# Patient Record
Sex: FEMALE | Race: WHITE | NOT HISPANIC OR LATINO | URBAN - METROPOLITAN AREA
[De-identification: names, ages, dates, MRNs, and addresses within clinical notes are randomized per-mention and may not be internally consistent; named-entity substitution may affect disease eponyms.]

---

## 2021-02-11 DIAGNOSIS — Z23 ENCOUNTER FOR IMMUNIZATION: ICD-10-CM

## 2021-02-15 ENCOUNTER — IMMUNIZATIONS (OUTPATIENT)
Dept: FAMILY MEDICINE CLINIC | Facility: HOSPITAL | Age: 81
End: 2021-02-15

## 2021-02-15 DIAGNOSIS — Z23 ENCOUNTER FOR IMMUNIZATION: Primary | ICD-10-CM

## 2021-02-15 PROCEDURE — 91300 SARS-COV-2 / COVID-19 MRNA VACCINE (PFIZER-BIONTECH) 30 MCG: CPT

## 2021-02-15 PROCEDURE — 0001A SARS-COV-2 / COVID-19 MRNA VACCINE (PFIZER-BIONTECH) 30 MCG: CPT

## 2021-03-09 ENCOUNTER — IMMUNIZATIONS (OUTPATIENT)
Dept: FAMILY MEDICINE CLINIC | Facility: HOSPITAL | Age: 81
End: 2021-03-09

## 2021-03-09 DIAGNOSIS — Z23 ENCOUNTER FOR IMMUNIZATION: Primary | ICD-10-CM

## 2021-03-09 PROCEDURE — 0002A SARS-COV-2 / COVID-19 MRNA VACCINE (PFIZER-BIONTECH) 30 MCG: CPT

## 2021-03-09 PROCEDURE — 91300 SARS-COV-2 / COVID-19 MRNA VACCINE (PFIZER-BIONTECH) 30 MCG: CPT

## 2023-12-15 ENCOUNTER — APPOINTMENT (EMERGENCY)
Dept: RADIOLOGY | Facility: HOSPITAL | Age: 83
End: 2023-12-15
Payer: MEDICARE

## 2023-12-15 ENCOUNTER — HOSPITAL ENCOUNTER (EMERGENCY)
Facility: HOSPITAL | Age: 83
Discharge: HOME | End: 2023-12-15
Attending: EMERGENCY MEDICINE
Payer: MEDICARE

## 2023-12-15 VITALS
OXYGEN SATURATION: 94 % | TEMPERATURE: 97.6 F | HEART RATE: 86 BPM | RESPIRATION RATE: 20 BRPM | SYSTOLIC BLOOD PRESSURE: 171 MMHG | DIASTOLIC BLOOD PRESSURE: 71 MMHG

## 2023-12-15 DIAGNOSIS — S43.006A SHOULDER DISLOCATION: ICD-10-CM
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
## 2023-12-15 DIAGNOSIS — T14.8XXA NEURAPRAXIA: ICD-10-CM

## 2023-12-15 PROCEDURE — G1004 CDSM NDSC: HCPCS

## 2023-12-15 PROCEDURE — 73030 X-RAY EXAM OF SHOULDER: CPT

## 2023-12-15 PROCEDURE — 70450 CT HEAD/BRAIN W/O DYE: CPT

## 2023-12-15 PROCEDURE — 99284 EMERGENCY DEPT VISIT MOD MDM: CPT

## 2023-12-15 PROCEDURE — 96374 THER/PROPH/DIAG INJ IV PUSH: CPT

## 2023-12-15 RX ORDER — ONDANSETRON 2 MG/ML
1 INJECTION INTRAMUSCULAR; INTRAVENOUS ONCE
Status: COMPLETED | OUTPATIENT
Start: 2023-12-15 | End: 2023-12-15

## 2023-12-15 RX ORDER — ACETAMINOPHEN 325 MG/1
975 TABLET ORAL ONCE
Status: COMPLETED | OUTPATIENT
Start: 2023-12-15 | End: 2023-12-15

## 2023-12-15 RX ORDER — KETOROLAC TROMETHAMINE 30 MG/ML
15 INJECTION, SOLUTION INTRAMUSCULAR; INTRAVENOUS ONCE
Status: DISCONTINUED | OUTPATIENT
Start: 2023-12-15 | End: 2023-12-15

## 2023-12-15 RX ORDER — FENTANYL CITRATE 50 UG/ML
1 INJECTION, SOLUTION INTRAMUSCULAR; INTRAVENOUS ONCE
Status: COMPLETED | OUTPATIENT
Start: 2023-12-15 | End: 2023-12-15

## 2023-12-15 RX ORDER — HYDROMORPHONE HCL IN WATER/PF 6 MG/30 ML
0.2 PATIENT CONTROLLED ANALGESIA SYRINGE INTRAVENOUS ONCE
Status: DISCONTINUED | OUTPATIENT
Start: 2023-12-15 | End: 2023-12-16 | Stop reason: HOSPADM

## 2023-12-15 RX ORDER — CHLOROPROCAINE HYDROCHLORIDE 30 MG/ML
20 INJECTION, SOLUTION EPIDURAL; INFILTRATION; INTRACAUDAL; PERINEURAL ONCE
Status: COMPLETED | OUTPATIENT
Start: 2023-12-15 | End: 2023-12-15

## 2023-12-15 RX ORDER — KETOROLAC TROMETHAMINE 30 MG/ML
15 INJECTION, SOLUTION INTRAMUSCULAR; INTRAVENOUS ONCE
Status: COMPLETED | OUTPATIENT
Start: 2023-12-15 | End: 2023-12-15

## 2023-12-15 RX ORDER — NAPROXEN 500 MG/1
500 TABLET ORAL 2 TIMES DAILY WITH MEALS
Qty: 30 TABLET | Refills: 0 | Status: SHIPPED | OUTPATIENT
Start: 2023-12-15

## 2023-12-15 RX ADMIN — KETOROLAC TROMETHAMINE 15 MG: 30 INJECTION, SOLUTION INTRAMUSCULAR; INTRAVENOUS at 18:03

## 2023-12-15 RX ADMIN — ACETAMINOPHEN 975 MG: 325 TABLET, FILM COATED ORAL at 18:03

## 2023-12-15 RX ADMIN — CHLOROPROCAINE HYDROCHLORIDE 20 ML: 30 INJECTION, SOLUTION EPIDURAL; INFILTRATION; INTRACAUDAL; PERINEURAL at 19:40

## 2023-12-15 NOTE — ED ATTENDING ATTESTATION
Final Diagnoses:     1. Fall, initial encounter    2. Shoulder dislocation    3. Martha Redman MD, saw and evaluated the patient. All available labs and X-rays were ordered by me or the resident / non-physician and have been reviewed by myself. I discussed the patient with the resident / non-physician and agree with the resident's / non-physician practitioner's findings and plan as documented in the resident's / non-physician practicitioner's note, except where noted. At this point, I agree with the current assessment done in the ED. I was present during key portions of all procedures performed unless otherwise stated. HPI:  NURSING TRIAGE:    This is a 81 y.o. female presenting for evaluation of likely right humeral fracture. The patient states that she was walking, tripped onto the patio, hit her right arm. No head trauma. She is on aspirin. No loss conscious. Remembers everything that happened. No numbness going down the arms or legs. Chief Complaint   Patient presents with    Fall     Trip onto patio fell onto R arm. Pain in R shoulder and arm +asa -thinners -LOC       PHYSICAL: ASSESSMENT + PLAN:   Pertinent: proximal RIGHT humeral tenderness, clavicular tenderness  M/U/R nerve sensation intact. Reduced axillary nerve    strength 5/5. Good capillary refill. 2+ radial pulses, bilaterally equal.   Finger abduction/adduction/opposition intact. Suppination/Pronation intact without reproduction of pain. Able to 1+2 and 1+5 finger appose. Able to 2+3 finger cross. No scaphoid tenderness  No snuff box tenderness. WE/WF/WRD/WUD 5/5, intact, without pain. BICEPS/TRICEPS intact but internal/external rotation elicit severe hsoulder pain. Refused Shoulder abduction/adduction    General: VS reviewed  Appears in NAD  awake, alert. Well-nourished, well-developed. Appears stated age. Speaking normally in full sentences.    Head: Normocephalic, atraumatic  Eyes: EOM-I. No diplopia. No hyphema. No subconjunctival hemorrhages. Symmetrical lids. ENT: Atraumatic external nose and ears. MMM  No malocclusion. No stridor. Normal phonation. No drooling. Normal swallowing. Neck: No JVD. CV: No pallor noted  Lungs:   No tachypnea  No respiratory distress  Abd: soft nt nd no rebound/guarding  MSK:   FROM spontaneously except as asbov.e   Skin: Dry, intact. Neuro: Awake, alert, GCS15, CN II-XII grossly intact. Motor grossly intact. Psychiatric/Behavioral: interacting normally; appropriate mood/affect.  Exam: deferred    Vitals:    12/15/23 1701 12/15/23 1748   BP: (!) 171/71    BP Location: Right arm    Pulse: 86    Resp: 20    Temp:  97.6 °F (36.4 °C)   TempSrc:  Tympanic   SpO2: 94%     X-ray for humeral fracture. No signs of head trauma. However, [address] given age + mechanism? She had to have hit her head. No c-spine midline tenderess. There are no obvious limitations to social determinants of care. Nursing note reviewed. Vitals reviewed. Orders placed by myself and/or advanced practitioner / resident. Previous chart was reviewed  No language barrier. History obtained from patient. There are no limitations to the history obtained:     Past Medical: Past Surgical:    has no past medical history on file. has no past surgical history on file. Social: Cardiac (Echo/Cath)   Social History     Substance and Sexual Activity   Alcohol Use None     Social History     Tobacco Use   Smoking Status Not on file   Smokeless Tobacco Not on file     Social History     Substance and Sexual Activity   Drug Use Not on file    No results found for this or any previous visit. No results found for this or any previous visit. No results found for this or any previous visit. Labs: Imaging:   Labs Reviewed - No data to display XR shoulder 2+ views RIGHT   Final Result   Relocated humeral head   No acute osseous abnormality.       Workstation performed: [id number]         CT head without contrast   Final Result      No acute intracranial abnormality. Evidence of prior sinonasal surgery with mild mucosal thickening in the left maxillary sinus and persistent opacification of the remaining sphenoid antra. Workstation performed: [id number]         XR shoulder 2+ views RIGHT   Final Result   Anterior shoulder dislocation noted without acute fracture identified. Workstation performed: [id number]            Medications: Code Status:   Medications   fentanyl citrate (PF) (FOR EMS ONLY) 100 mcg/2 mL injection 100 mcg (0 mcg Does not apply Given to EMS 12/15/23 1706)   ondansetron (FOR EMS ONLY) (ZOFRAN) 4 mg/2 mL injection 4 mg (0 mg Does not apply Given to EMS 12/15/23 1706)   acetaminophen (TYLENOL) tablet 975 mg (975 mg Oral Given 12/15/23 1803)   ketorolac (TORADOL) injection 15 mg (15 mg Intravenous Given 12/15/23 1803)   chloroprocaine HCl (PF) 3 % injection 20 mL (20 mL Injection Given by Other 12/15/23 1940)    Code Status: No Order  Advance Directive and Living Will:      Power of :    POLST:       Orders Placed This Encounter   Procedures    Nerve block    XR shoulder 2+ views RIGHT    CT head without contrast    XR shoulder 2+ views RIGHT     Time reflects when diagnosis was documented in both MDM as applicable and the Disposition within this note       Time User Action Codes Description Comment    12/15/2023  9:13 PM Danette Sell Add [Z82. VJBX] Fall, initial encounter     12/15/2023  9:14 PM Danette Sell Add [Z65.041A] Shoulder dislocation     12/15/2023  9:14 PM Danette Sell Add [S44.30XA] Axillary nerve injury     12/15/2023  9:17 PM Danette Sell Remove [S44.30XA] Axillary nerve injury     12/15/2023  9:17 PM Ivroy Bird, [address] [address]           ED Disposition       ED Disposition   Discharge    Condition   Stable    Date/Time   Fri Dec 15, 2023  9:13 PM    Comment   Nini Davis discharge to home/self care. Follow-up Information       Follow up With Specialties Details Why Contact Info Additional Information    [facility]   [address] [address]  [address] [place] [address], [address]  Use Entrance A           Discharge Medication List as of 12/15/2023  9:18 PM        START taking these medications    Details   naproxen (Naprosyn) 500 mg tablet Take 1 tablet (500 mg total) by mouth 2 (two) times a day with meals, Starting Fri 12/15/2023, Normal           No discharge procedures on file. None                        Portions of the record may have been created with voice recognition software. Occasional wrong word or "sound a like" substitutions may have occurred due to the inherent limitations of voice recognition software. Read the chart carefully and recognize, using context, where substitutions have occurred.     Electronically signed by:  Lisa James

## 2023-12-15 NOTE — ED PROVIDER NOTES
Chief Complaint   Patient presents with    Fall     Trip onto patio fell onto R arm. Pain in R shoulder and arm +asa -thinners -LOC      History of Present Illness and Review of Systems   This is a 81 y.o. female with PMH significant for HTN, HLD, osteoporosis coming in today with complaint of fall. The patient's was walking on her patio and experienced a mechanical fall, found on the right side. Reports pain in her right arm and shoulder at this time. Denies any elbow or wrist pain. No head strike or loss of conscious. Denies any chest wall tenderness, increased work of breathing, abdominal pain, difficulties ambulating. She called EMS given her intractable pain was given fentanyl and route, otherwise hemodynamically stable and transported without issue. She denies any anticoagulant use. No paresthesias. No history of shoulder orthopedic instrumentation. No other symptoms currently. - No language barrier. No other complaints for this encounter. Remainder of ROS Reviewed and Non-Pertinent    Past Medical, Past Surgical History:    has no past medical history on file. has no past surgical history on file. Allergies: Allergies   Allergen Reactions    Augmentin [Amoxicillin-Pot Clavulanate] Anaphylaxis    Penicillins Anaphylaxis       Social and Family History:     Social History     Substance and Sexual Activity   Alcohol Use None     Social History     Tobacco Use   Smoking Status Not on file   Smokeless Tobacco Not on file     Social History     Substance and Sexual Activity   Drug Use Not on file       Physical Examination     Vitals:    12/15/23 1701 12/15/23 1748   BP: (!) 171/71    BP Location: Right arm    Pulse: 86    Resp: 20    Temp:  97.6 °F (36.4 °C)   TempSrc:  Tympanic   SpO2: 94%        Physical Exam  Vitals and nursing note reviewed. Constitutional:       General: She is not in acute distress. Appearance: She is well-developed.    HENT:      Head: Normocephalic and atraumatic. Eyes:      Conjunctiva/sclera: Conjunctivae normal.   Cardiovascular:      Rate and Rhythm: Normal rate and regular rhythm. Heart sounds: No murmur heard. Pulmonary:      Effort: Pulmonary effort is normal. No respiratory distress. Breath sounds: Normal breath sounds. Abdominal:      Palpations: Abdomen is soft. Tenderness: There is no abdominal tenderness. Musculoskeletal:         General: Tenderness present. No swelling. Cervical back: Neck supple. Comments: Pain reproducible to palpation of the right upper humerus, no sulcus sign, no tenderness over clavicle, no elbow or forearm or wrist tenderness, compartments are soft, neurovascular intact    Remainder of secondary survey is negative, no evidence of head trauma on examination or otherwise   Skin:     General: Skin is warm and dry. Capillary Refill: Capillary refill takes less than 2 seconds. Neurological:      General: No focal deficit present. Mental Status: She is alert and oriented to person, place, and time. Mental status is at baseline. Cranial Nerves: No cranial nerve deficit. Sensory: Sensory deficit present. Comments: Diminished sensation over the axillary dermatome on the R side, motor function appears intact however unclear due to pain   Psychiatric:         Mood and Affect: Mood normal.           Procedures   Procedures      MDM:   Medical Decision Making  Monique Arshad is a 81 y.o. who presents with complaints of fall    Vital signs are stable, physical exam shows the patient has right-sided tenderness to her humerus no evidence of dislocation, no other evidence of injury on secondary survey    Ddx: Clinically concerning for humeral fracture versus shoulder dislocation, no evidence of head or neck injury. She has no tenderness in her chest wall or abdomen or pelvis either which is reassuring against another traumatic injury.     Plan: Workup will include plain films, pain control. Will monitor closely and reassess. Reassessment/Disposition: Workup ultimately concerning for acute right-sided shoulder dislocation with glenoid fracture. She also had evidence of neuropraxia to her axillary nerve given her diminished sensation in the axillary distribution. Ultimately reduced without complication, placed in a sling and recommended outpatient Ortho follow-up. Advised on return precautions as well. No evidence of intracranial bleed or humeral fracture. Amount and/or Complexity of Data Reviewed  Radiology: ordered. Risk  OTC drugs. Prescription drug management. - Reviewed relevant past office visits/hospitalizations/procedures  -Obtained pertinent history that influenced decision making from the pts family    ED Course as of 12/15/23 2140   Fri Dec 15, 2023   1820 Concerning for dislocation   2029 Successfully reduced. Will place in abduction sling and DC      Final Dispo   Final Diagnosis:  1. Fall, initial encounter    2. Shoulder dislocation    3. Neurapraxia      Time reflects when diagnosis was documented in both MDM as applicable and the Disposition within this note       Time User Action Codes Description Comment    12/15/2023  9:13 PM Mesa Iha Add [F35. NLWQ] Fall, initial encounter     12/15/2023  9:14 PM Mesa Iha Add [X25.753W] Shoulder dislocation     12/15/2023  9:14 PM Mesa Iha Add [S44.30XA] Axillary nerve injury     12/15/2023  9:17 PM Mesa Iha Remove [S44.30XA] Axillary nerve injury     12/15/2023  9:17 PM Pebbles Prescott, [address] [place] [address]           ED Disposition       ED Disposition   Discharge    Condition   Stable    Date/Time   Fri Dec 15, 2023  9:13 PM    Comment   Arsen Henderson discharge to home/self care.                    Follow-up Information       Follow up With Specialties Details Why Contact Info Additional [address] [facility]   [address]   [facility] [address]  [facility], [address] [place] [facility], [address], [address] [phone]  Use Entrance A           Medications   HYDROmorphone HCl (DILAUDID) injection 0.2 mg (0.2 mg Intravenous Not Given 12/15/23 2121)   fentanyl citrate (PF) (FOR EMS ONLY) 100 mcg/2 mL injection 100 mcg (0 mcg Does not apply Given to EMS 12/15/23 1706)   ondansetron (FOR EMS ONLY) (ZOFRAN) 4 mg/2 mL injection 4 mg (0 mg Does not apply Given to EMS 12/15/23 1706)   acetaminophen (TYLENOL) tablet 975 mg (975 mg Oral Given 12/15/23 1803)   ketorolac (TORADOL) injection 15 mg (15 mg Intravenous Given 12/15/23 1803)   chloroprocaine HCl (PF) 3 % injection 20 mL (20 mL Injection Given by Other 12/15/23 1940)       Risk Stratification Tools                Orders Placed This Encounter   Procedures    Nerve block    XR shoulder 2+ views RIGHT    CT head without contrast    XR shoulder 2+ views RIGHT    Diet Regular; Regular House    Apply sling       Labs:   Labs Reviewed - No data to display    Imaging:     CT head without contrast   Final Result by Emily Nava MD (12/15 1934)      No acute intracranial abnormality. Evidence of prior sinonasal surgery with mild mucosal thickening in the left maxillary sinus and persistent opacification of the remaining sphenoid antra. Workstation performed: [id number]         XR shoulder 2+ views RIGHT    (Results Pending)   XR shoulder 2+ views RIGHT    (Results Pending)      All details of the evaluation and treatment plan were made clear and additionally all questions and concerns were addressed while under my care. Portions of the record may have been created with voice recognition software. Occasional wrong word or "sound a like" substitutions may have occurred due to the inherent limitations of voice recognition software.  Read the chart carefully and recognize, using context, where substitutions have occurred. The attending physician physically available and evaluated the above patient alongside myself.       Elda Sanchez MD  12/15/23 9605

## 2023-12-16 NOTE — RESTORATIVE TECHNICIAN NOTE
Restorative Technician Note      Patient Name: Yvonne Biggs     [facility] Visit Date: 12/15/23  Note Type: Bracing, Initial consult  Patient Position Upon Consult: Seated edge of bed  Brace Applied: Shoulder Abduction with Pillow (right)  Patient Position When Brace Applied: Seated  Education Provided: Yes; Family or social support of family present for education by provider  Patient Position at End of Consult: Seated edge of bed  Nurse Communication: Nurse aware of consult, application of brace

## 2023-12-16 NOTE — ED PROCEDURE NOTE
Procedure  Nerve block    Date/Time: 12/15/2023 7:58 PM    Performed by: Shannen Rich MD  Authorized by: Shannen Rich MD  Universal Protocol:  Procedure performed by: (Dr. Martha Hurst)  Consent: Verbal consent obtained. Risks and benefits: risks, benefits and alternatives were discussed  Consent given by: patient  Patient understanding: patient states understanding of the procedure being performed  Patient consent: the patient's understanding of the procedure matches consent given  Procedure consent: procedure consent matches procedure scheduled  Relevant documents: relevant documents present and verified  Test results: test results available and properly labeled  Site marked: the operative site was marked  Radiology Images displayed and confirmed. If images not available, report reviewed: imaging studies available  Patient identity confirmed: verbally with patient    Indications:     Indications:  Procedural anesthesia  Location:     Body area:  Upper extremity    Upper extremity nerve blocked: interscalene. Laterality:  Right  Pre-procedure details:     Skin preparation:  2% chlorhexidine    Preparation: Patient was prepped and draped in usual sterile fashion    Skin anesthesia (see MAR for exact dosages):     Skin anesthesia method:  None  Procedure details (see MAR for exact dosages): Block needle gauge:  21 G    Guidance: ultrasound      Block anesthetic: chlorprocaine. Steroid injected:  None    Additive injected:  None    Injection procedure:  Anatomic landmarks palpated, anatomic landmarks identified, introduced needle, negative aspiration for blood and incremental injection  Post-procedure details:     Dressing:  None    Outcome:  Anesthesia achieved    Patient tolerance of procedure:   Tolerated well, no immediate complications                   Shannen Rich MD  12/15/23 2012

## 2023-12-16 NOTE — DISCHARGE INSTRUCTIONS
Your workup here was not concerning for anything dangerous. Therefore there is no need for you to stay at the hospital for further testing. We feel safe to send you home. You can use Naprosyn for management of your symptoms. You should follow up with ortho to assess for resolution of your symptoms and to determine if there is any further evaluation that needs to be performed. Return to the emergency department if you have any symptoms of worsening pain or numbness    Thank you for choosing [address] [place] [address] Street for your care!